# Patient Record
(demographics unavailable — no encounter records)

---

## 2024-11-22 NOTE — PHYSICAL EXAM
[TextEntry] : General: awake, alert, no acute distress, interactive, cooperative, appropriate for age Head: normocephalic, no signs injury Eyes: + red reflex bilaterally, EOMI, no purulent discharge, no conjunctival or scleral erythema Ears: tympanic membranes normal appearing bilaterally, no ear pit or tag Nose: no discharge Mouth: mucosa moist and pink, oropharynx without erythema Neck: supple, FROM Lungs: crackles B/L throughout, no accessory muscle use  Cardiac: normal S1 S2, regular rate and rhythm, no murmur appreciated Abdomen: soft, non tender, non distended, no hepatosplenomegaly  Chest: no gynecomastia Genitals: mk 1 normal appearing male genitalia, testicles descended bilaterally Lymphatics: no abnormal lymph nodes palpated Back: no scoliosis noted Skin: no rash, no lesions syncope

## 2024-11-22 NOTE — HISTORY OF PRESENT ILLNESS
[Mother] : mother [FreeTextEntry7] : 6yr [facility] [FreeTextEntry1] : lives with parents in grade 1st has been making good progress since the begining of the year  doing well in school, likes teacher, has friends, academically doing well has a 504 and seeing counselor through school  no problems in school identified participates in [employer]  no history of injury  and  patient is doing well - has no concerns or issues. appetite good, eats variety of foods, 3 meals a day and snacks, consumes fruits, vegetables, meat, dairy no sleep concerns, goes to dentist regularly, brushing teeth 1-2 x a day (tries 2 x a day) patient not having any fevers without a cause, pain that wakes them in the night, or night sweats. Urinating and stooling normally, no chest pain, palpitations or syncope with exercise. Parent(s) have no current concerns or issues saw opthalmolgy 2 days ago and passed vision check   cough x 2 weeks no fever not getting better h/o otitis media recurrent

## 2024-11-22 NOTE — PLAN
[TextEntry] : Continue balanced diet with all food groups. Brush teeth twice a day with toothbrush. Recommend visit to dentist. Help child to maintain consistent daily routines and sleep schedule. School discussed. Ensure home is safe. Teach child about personal safety. Use consistent, positive discipline. Limit screen time to no more than 2 hours per day. Encourage physical activity. Child needs to ride in a belt-positioning booster seat until  4 feet 9 inches has been reached and are between 8 and 12 years of age.  Can participate in all age related sports without restriction.  Return 1 year for routine well child check, sooner if concerns. 5-2-1-0 form reviewed, care coordination reviewed  Symptomatic treatment of fever and/or pain discussed Start medication as instructed Ibuprofen for pain Hydrate well Handwashing and infection control discussed Return to office if febrile > 48 hours or if symptoms get worse Go to ER if unable to come to the office or during after hours, parent encouraged to call service first before doing so. Follow up 35-7 days breathing recheck or sooner if worsening

## 2024-12-02 NOTE — HISTORY OF PRESENT ILLNESS
[de-identified] :  f/u breathing as per mom completed meds doing better [FreeTextEntry6] : doing well finished course antibiotics No fever, No cough, No ear pain, No nasal congestion No wheezing Normal appetite, No vomiting, No diarrhea no complaints

## 2024-12-02 NOTE — PLAN
[TextEntry] : mom needed therapy services follow up help again, never followed up last year when she was put in touch with [facility] gave her info about [facility] and [facility] follow up as discussed